# Patient Record
Sex: MALE | Race: AMERICAN INDIAN OR ALASKA NATIVE
[De-identification: names, ages, dates, MRNs, and addresses within clinical notes are randomized per-mention and may not be internally consistent; named-entity substitution may affect disease eponyms.]

---

## 2018-10-04 ENCOUNTER — HOSPITAL ENCOUNTER (EMERGENCY)
Dept: HOSPITAL 5 - ED | Age: 43
End: 2018-10-04
Payer: SELF-PAY

## 2018-10-04 DIAGNOSIS — I46.8: Primary | ICD-10-CM
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
## 2018-10-04 PROCEDURE — 92950 HEART/LUNG RESUSCITATION CPR: CPT

## 2018-10-04 PROCEDURE — 99285 EMERGENCY DEPT VISIT HI MDM: CPT
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
